# Patient Record
Sex: MALE | Race: WHITE | Employment: FULL TIME | URBAN - METROPOLITAN AREA
[De-identification: names, ages, dates, MRNs, and addresses within clinical notes are randomized per-mention and may not be internally consistent; named-entity substitution may affect disease eponyms.]

---

## 2017-06-13 ENCOUNTER — HOSPITAL ENCOUNTER (EMERGENCY)
Age: 27
Discharge: HOME | End: 2017-06-13
Attending: EMERGENCY MEDICINE
Payer: SELF-PAY

## 2017-06-13 VITALS
SYSTOLIC BLOOD PRESSURE: 135 MMHG | WEIGHT: 200 LBS | OXYGEN SATURATION: 99 % | HEIGHT: 74 IN | BODY MASS INDEX: 25.67 KG/M2 | RESPIRATION RATE: 16 BRPM | TEMPERATURE: 98.5 F | HEART RATE: 88 BPM | DIASTOLIC BLOOD PRESSURE: 78 MMHG

## 2017-06-13 DIAGNOSIS — L02.91 ABSCESS: Primary | ICD-10-CM

## 2017-06-13 PROCEDURE — 75810000218 HC FINE NEEDLE ASPIRATION: Performed by: EMERGENCY MEDICINE

## 2017-06-13 PROCEDURE — 90715 TDAP VACCINE 7 YRS/> IM: CPT | Performed by: EMERGENCY MEDICINE

## 2017-06-13 PROCEDURE — 99283 EMERGENCY DEPT VISIT LOW MDM: CPT | Performed by: EMERGENCY MEDICINE

## 2017-06-13 PROCEDURE — 90471 IMMUNIZATION ADMIN: CPT | Performed by: EMERGENCY MEDICINE

## 2017-06-13 PROCEDURE — 74011250636 HC RX REV CODE- 250/636: Performed by: EMERGENCY MEDICINE

## 2017-06-13 RX ORDER — CEPHALEXIN 500 MG/1
500 CAPSULE ORAL 4 TIMES DAILY
Qty: 28 CAP | Refills: 0 | Status: SHIPPED | OUTPATIENT
Start: 2017-06-13 | End: 2017-06-20

## 2017-06-13 RX ADMIN — TETANUS TOXOID, REDUCED DIPHTHERIA TOXOID AND ACELLULAR PERTUSSIS VACCINE, ADSORBED 0.5 ML: 5; 2.5; 8; 8; 2.5 SUSPENSION INTRAMUSCULAR at 09:29

## 2017-06-13 NOTE — ED TRIAGE NOTES
Pt states he has been working in an attic and noticed a bump on his left groin area that is painful.

## 2017-06-13 NOTE — ED PROVIDER NOTES
HPI Comments: Patient's age 71-year-old male who presents with a bump on his left inner thigh. Patient states he's been working in an attic and thinks a spider bitten him. He states redness and swelling and pain pointing to his left inner thigh region. Denies any fevers, no chills, nausea or vomiting, no further complaints. Patient very well appearing, in no acute distress. States history of a prior abscess in the past which resolved with keflex (states allergic to bactrim). Patient is a 23 y.o. male presenting with abscess. The history is provided by the patient. No  was used. Abscess           History reviewed. No pertinent past medical history. History reviewed. No pertinent surgical history. History reviewed. No pertinent family history. Social History     Social History    Marital status: SINGLE     Spouse name: N/A    Number of children: N/A    Years of education: N/A     Occupational History    Not on file. Social History Main Topics    Smoking status: Current Every Day Smoker    Smokeless tobacco: Not on file    Alcohol use No    Drug use: No    Sexual activity: Not on file     Other Topics Concern    Not on file     Social History Narrative    No narrative on file         ALLERGIES: Bactrim [sulfamethoprim ds] and Sulfa (sulfonamide antibiotics)    Review of Systems   Constitutional: Negative for chills and fever. HENT: Negative for rhinorrhea and sore throat. Eyes: Negative for visual disturbance. Respiratory: Negative for cough and shortness of breath. Cardiovascular: Negative for chest pain and leg swelling. Gastrointestinal: Negative for abdominal pain, diarrhea, nausea and vomiting. Genitourinary: Negative for dysuria. Musculoskeletal: Negative for back pain and neck pain. Skin: Positive for rash (per HPI). Neurological: Negative for weakness and headaches. Psychiatric/Behavioral: The patient is not nervous/anxious. Vitals:    06/13/17 0802   BP: 138/66   Pulse: 90   Resp: 18   Temp: 98.3 °F (36.8 °C)   SpO2: 98%   Weight: 90.7 kg (200 lb)   Height: 6' 2\" (1.88 m)            Physical Exam   Constitutional: He is oriented to person, place, and time. He appears well-developed and well-nourished. HENT:   Head: Normocephalic. Right Ear: External ear normal.   Left Ear: External ear normal.   Eyes: Conjunctivae and EOM are normal. Pupils are equal, round, and reactive to light. Neck: Normal range of motion. Neck supple. No tracheal deviation present. Cardiovascular: Normal rate, regular rhythm, normal heart sounds and intact distal pulses. No murmur heard. Pulmonary/Chest: Effort normal and breath sounds normal. No respiratory distress. Abdominal: Soft. There is no tenderness. Musculoskeletal: Normal range of motion. DP 2+ bilaterally, full ROM of extremities without difficulty. Ambulatory with no difficulty. Neurological: He is alert and oriented to person, place, and time. No cranial nerve deficit. Skin: Rash (and mild swelling to left inner thigh. Area is approximately 1 cm in diameter with white head noted. distinctly separate from penis/testicles or rectum by 5-6 inches) noted. Bedside US with minimal fluid collection noted superficially. Nursing note and vitals reviewed.        MDM  Number of Diagnoses or Management Options  Abscess: new and requires workup     Amount and/or Complexity of Data Reviewed  Review and summarize past medical records: yes    Risk of Complications, Morbidity, and/or Mortality  Presenting problems: moderate  Diagnostic procedures: moderate  Management options: moderate    Patient Progress  Patient progress: stable    ED Course       I&D Abcess Simple  Date/Time: 6/13/2017 8:34 AM  Performed by: Mary Valles  Authorized by: Mary Valles     Consent:     Consent obtained:  Verbal    Consent given by:  Patient    Risks discussed:  Bleeding, incomplete drainage, pain and infection    Alternatives discussed:  No treatment  Location:     Type:  Abscess    Location:  Lower extremity    Lower extremity location:  Leg    Leg location:  L upper leg  Pre-procedure details:     Procedure prep: alcohol swab. Anesthesia (see MAR for exact dosages): Anesthesia method:  None  Procedure type:     Complexity:  Simple  Procedure details:     Needle aspiration: yes      Needle gauge: 21 G. Drainage:  Purulent and serosanguinous    Drainage amount:  Scant    Wound treatment:  Wound left open    Packing materials:  None  Post-procedure details:     Patient tolerance of procedure: Tolerated well, no immediate complications        [id number] male with abscess:    Needle aspiration as above with purulent return followed by mild amount of blood. Neosporin/bandaid placed, given rx for keflex and given tdap in ED (unknown last tetanus shot). Patient agrees to return with any swelling, redness, fevers or chills nausea or vomiting, any drainage from the wound, or any further concerns and otherwise to follow up with PCP in 2-3 days for recheck.

## 2017-06-13 NOTE — DISCHARGE INSTRUCTIONS
Skin Abscess: Care Instructions  Your Care Instructions    A skin abscess is a bacterial infection that forms a pocket of pus. A boil is a kind of skin abscess. The doctor may have cut an opening in the abscess so that the pus can drain out. You may have gauze in the cut so that the abscess will stay open and keep draining. You may need antibiotics. You will need to follow up with your doctor to make sure the infection has gone away. The doctor has checked you carefully, but problems can develop later. If you notice any problems or new symptoms, get medical treatment right away. Follow-up care is a key part of your treatment and safety. Be sure to make and go to all appointments, and call your doctor if you are having problems. It's also a good idea to know your test results and keep a list of the medicines you take. How can you care for yourself at home? · Apply warm and dry compresses, a heating pad set on low, or a hot water bottle 3 or 4 times a day for pain. Keep a cloth between the heat source and your skin. · If your doctor prescribed antibiotics, take them as directed. Do not stop taking them just because you feel better. You need to take the full course of antibiotics. · Take pain medicines exactly as directed. ¨ If the doctor gave you a prescription medicine for pain, take it as prescribed. ¨ If you are not taking a prescription pain medicine, ask your doctor if you can take an over-the-counter medicine. · Keep your bandage clean and dry. Change the bandage whenever it gets wet or dirty, or at least one time a day. · If the abscess was packed with gauze:  ¨ Keep follow-up appointments to have the gauze changed or removed. If the doctor instructed you to remove the gauze, gently pull out all of the gauze when your doctor tells you to. ¨ After the gauze is removed, soak the area in warm water for 15 to 20 minutes 2 times a day, until the wound closes. When should you call for help?   Call your doctor now or seek immediate medical care if:  · You have signs of worsening infection, such as:  ¨ Increased pain, swelling, warmth, or redness. ¨ Red streaks leading from the infected skin. ¨ Pus draining from the wound. ¨ A fever. Watch closely for changes in your health, and be sure to contact your doctor if:  · You do not get better as expected. Where can you learn more? Go to http://keron-randy.info/. Enter X646 in the search box to learn more about \"Skin Abscess: Care Instructions. \"  Current as of: October 13, 2016  Content Version: 11.2  © 4312-0953 [employer]. Care instructions adapted under license by [employer] (which disclaims liability or warranty for this information). If you have questions about a medical condition or this instruction, always ask your healthcare professional. [address] any warranty or liability for your use of this information. Cephalexin (By mouth)   Cephalexin (vul-y-KDP-in)  Treats infections. This medicine is a cephalosporin antibiotic. Brand Name(s): Dashelley Keflex   There may be other brand names for this medicine. When This Medicine Should Not Be Used: This medicine is not right for everyone. Do not use this medicine if you had an allergic reaction to cephalexin or another cephalosporin medicine. How to Use This Medicine:   Capsule, Tablet, Tablet for Suspension, Liquid  · Your doctor will tell you how much medicine to use. Do not use more than directed. · Read and follow the patient instructions that come with this medicine. Talk to your doctor or pharmacist if you have any questions. · You may take your medicine with food or milk to avoid stomach upset. · Oral liquid: Shake well just before use. Measure the oral liquid medicine with a marked measuring spoon, oral syringe, or medicine cup.   · Take all of the medicine in your prescription to clear up your infection, even if you feel better after the first few doses. · Missed dose: Take a dose as soon as you remember. If it is almost time for your next dose, wait until then and take a regular dose. Do not take extra medicine to make up for a missed dose. · Capsule or tablet: Store at room temperature away from heat, moisture, and direct light. · Oral liquid: Store in the refrigerator for 14 days. After 14 days, throw away any unused medicine. Do not freeze. Drugs and Foods to Avoid:   Ask your doctor or pharmacist before using any other medicine, including over-the-counter medicines, vitamins, and herbal products. · Some medicines and foods can affect how cephalexin works. Tell your doctor if you are also using  ¨ Metformin  ¨ Probenecid  Warnings While Using This Medicine:   · Tell your doctor if you are pregnant or breastfeeding, or if you have kidney disease, liver disease, or a history of digestive problems, such as colitis. Tell your doctor if you are allergic to penicillin. · This medicine can cause diarrhea. Call your doctor if the diarrhea becomes severe, does not stop, or is bloody. Do not take any medicine to stop diarrhea until you have talked to your doctor. Diarrhea can occur 2 months or more after you stop taking this medicine. · Tell any doctor or dentist who treats you that you are using this medicine. This medicine may affect certain medical test results. · Call your doctor if your symptoms do not improve or if they get worse. · Keep all medicine out of the reach of children. Never share your medicine with anyone.   Possible Side Effects While Using This Medicine:   Call your doctor right away if you notice any of these side effects:  · Allergic reaction: Itching or hives, swelling in your face or hands, swelling or tingling in your mouth or throat, chest tightness, trouble breathing  · Blistering, peeling, red skin rash  · Severe diarrhea, especially if bloody or ongoing  · Severe stomach pain, vomiting  If you notice these less serious side effects, talk with your doctor:   · Mild diarrhea or nausea  If you notice other side effects that you think are caused by this medicine, tell your doctor. Call your doctor for medical advice about side effects. You may report side effects to FDA at 2-093-FDA-0504  © 2017 [employer] [employer]  Information is for End User's use only and may not be sold, redistributed or otherwise used for commercial purposes. The above information is an  only. It is not intended as medical advice for individual conditions or treatments. Talk to your doctor, nurse or pharmacist before following any medical regimen to see if it is safe and effective for you.

## 2017-08-08 ENCOUNTER — HOSPITAL ENCOUNTER (EMERGENCY)
Age: 27
Discharge: HOME | End: 2017-08-08
Attending: EMERGENCY MEDICINE
Payer: SELF-PAY

## 2017-08-08 VITALS
WEIGHT: 180 LBS | BODY MASS INDEX: 23.1 KG/M2 | OXYGEN SATURATION: 100 % | SYSTOLIC BLOOD PRESSURE: 132 MMHG | DIASTOLIC BLOOD PRESSURE: 69 MMHG | RESPIRATION RATE: 18 BRPM | HEART RATE: 77 BPM | HEIGHT: 74 IN | TEMPERATURE: 98 F

## 2017-08-08 DIAGNOSIS — L02.91 ABSCESS: Primary | ICD-10-CM

## 2017-08-08 PROCEDURE — 75810000289 HC I&D ABSCESS SIMP/COMP/MULT: Performed by: NURSE PRACTITIONER

## 2017-08-08 PROCEDURE — 74011250637 HC RX REV CODE- 250/637: Performed by: NURSE PRACTITIONER

## 2017-08-08 PROCEDURE — 99283 EMERGENCY DEPT VISIT LOW MDM: CPT | Performed by: NURSE PRACTITIONER

## 2017-08-08 RX ORDER — TRAMADOL HYDROCHLORIDE 50 MG/1
50 TABLET ORAL
Status: COMPLETED | OUTPATIENT
Start: 2017-08-08 | End: 2017-08-08

## 2017-08-08 RX ORDER — CEPHALEXIN 500 MG/1
500 CAPSULE ORAL 4 TIMES DAILY
Qty: 28 CAP | Refills: 0 | Status: SHIPPED | OUTPATIENT
Start: 2017-08-08 | End: 2017-08-15

## 2017-08-08 RX ADMIN — TRAMADOL HYDROCHLORIDE 50 MG: 50 TABLET, FILM COATED ORAL at 10:55

## 2017-08-08 NOTE — ED PROVIDER NOTES
HPI Comments: Patient presents with abscess above right knee for the past 3 days. He states some drainage from the area this morning while taking shower. He denies fever. Patient is a 33 y.o. male presenting with skin problem. The history is provided by the patient. Skin Problem    This is a new problem. The current episode started more than 2 days ago. The problem has been gradually worsening. The problem is associated with nothing. There has been no fever. The rash is present on the right upper leg. The pain is at a severity of 5/10. The pain is mild. The pain has been fluctuating since onset. Associated symptoms include pain. He has tried nothing for the symptoms. History reviewed. No pertinent past medical history. History reviewed. No pertinent surgical history. History reviewed. No pertinent family history. Social History     Social History    Marital status: SINGLE     Spouse name: N/A    Number of children: N/A    Years of education: N/A     Occupational History    Not on file. Social History Main Topics    Smoking status: Current Every Day Smoker    Smokeless tobacco: Not on file    Alcohol use No    Drug use: No    Sexual activity: Not on file     Other Topics Concern    Not on file     Social History Narrative         ALLERGIES: Bactrim [sulfamethoprim ds]; Erythromycin; and Sulfa (sulfonamide antibiotics)    Review of Systems   Constitutional: Negative for chills and fever. Respiratory: Negative for cough and shortness of breath. Cardiovascular: Negative for chest pain. Skin: Positive for color change and wound. Neurological: Negative for dizziness and weakness. Vitals:    08/08/17 0939   BP: 137/69   Pulse: 81   Resp: 16   Temp: 98.4 °F (36.9 °C)   SpO2: 100%   Weight: 81.6 kg (180 lb)   Height: 6' 2\" (1.88 m)            Physical Exam   Constitutional: He appears well-developed and well-nourished. No distress.    Cardiovascular: Normal rate and regular rhythm. No murmur heard. Pulmonary/Chest: Effort normal and breath sounds normal.   Musculoskeletal:        Right upper leg: He exhibits tenderness and swelling. He exhibits no deformity and no laceration. Legs:  Skin: Skin is warm, dry and intact. He is not diaphoretic. There is erythema. No pallor. Psychiatric: He has a normal mood and affect. His behavior is normal.   Nursing note and vitals reviewed. MDM  Number of Diagnoses or Management Options  Abscess: new and does not require workup  Diagnosis management comments: I&D preformed. Dressing applied. Patient given prescription for keflex. Amount and/or Complexity of Data Reviewed  Tests in the medicine section of CPT®: ordered    Patient Progress  Patient progress: stable    ED Course       I&D Abcess Simple  Date/Time: 8/8/2017 10:18 AM  Performed by: Zuleyma Espinoza  Authorized by: Zuleyma Espinoza     Consent:     Consent obtained:  Verbal    Consent given by:  Patient    Risks discussed:  Incomplete drainage    Alternatives discussed:  No treatment  Location:     Type:  Abscess    Size:  2cm x 2cm    Location:  Lower extremity    Lower extremity location:  Leg    Leg location:  R upper leg  Pre-procedure details:     Skin preparation:  Betadine  Anesthesia (see MAR for exact dosages): Anesthesia method:  Local infiltration    Local anesthetic:  Lidocaine 1% w/o epi  Procedure type:     Complexity:  Simple  Procedure details:     Needle aspiration: no      Incision types:  Single straight    Scalpel blade:  11    Wound management:  Probed and deloculated    Drainage:  Bloody    Drainage amount: Moderate    Wound treatment:  Wound left open    Packing materials:  None  Post-procedure details:     Patient tolerance of procedure:   Tolerated well, no immediate complications

## 2017-08-08 NOTE — LETTER
[facility] EMERGENCY DEPT [address] [address] 
[phone] Work/School Note Date: 8/8/2017 To Whom It May concern: 
 
Fe Haywood was seen and treated today in the emergency room by the following provider(s): 
Attending Provider: Rola Orlando MD 
Nurse Practitioner: ROMA Aaron. Fe Haywood was given 1 pill of tramadol for pain while in the Emergency Department. He was not given a prescription. He is to use over the counter Tylenol/Motrin for pain.   
 
Sincerely, 
 
 
 
 
ROMA Aaron

## 2017-08-08 NOTE — DISCHARGE INSTRUCTIONS

## 2024-06-05 ENCOUNTER — HOSPITAL ENCOUNTER (INPATIENT)
Facility: HOSPITAL | Age: 34
LOS: 1 days | Discharge: LEFT BEFORE DISCHARGE | DRG: 894 | End: 2024-06-07
Attending: PSYCHIATRY & NEUROLOGY | Admitting: PSYCHIATRY & NEUROLOGY

## 2024-06-05 ENCOUNTER — HOSPITAL ENCOUNTER (EMERGENCY)
Facility: HOSPITAL | Age: 34
Discharge: PSYCHIATRIC OR SUBSTANCE USE FACILITY | End: 2024-06-05
Attending: EMERGENCY MEDICINE | Admitting: EMERGENCY MEDICINE

## 2024-06-05 VITALS
HEIGHT: 74 IN | RESPIRATION RATE: 18 BRPM | DIASTOLIC BLOOD PRESSURE: 95 MMHG | HEART RATE: 95 BPM | TEMPERATURE: 98 F | WEIGHT: 160 LBS | BODY MASS INDEX: 20.53 KG/M2 | SYSTOLIC BLOOD PRESSURE: 144 MMHG | OXYGEN SATURATION: 100 %

## 2024-06-05 DIAGNOSIS — F11.20 FENTANYL DEPENDENCE: Primary | ICD-10-CM

## 2024-06-05 DIAGNOSIS — F15.10 METHAMPHETAMINE ABUSE: ICD-10-CM

## 2024-06-05 DIAGNOSIS — T42.4X4A BENZODIAZEPINE OVERDOSE OF UNDETERMINED INTENT, INITIAL ENCOUNTER: ICD-10-CM

## 2024-06-05 PROBLEM — F15.20 METHAMPHETAMINE USE DISORDER, SEVERE, DEPENDENCE: Status: ACTIVE | Noted: 2024-06-05

## 2024-06-05 PROBLEM — F10.20 ALCOHOL USE DISORDER, SEVERE, DEPENDENCE: Status: ACTIVE | Noted: 2024-06-05

## 2024-06-05 PROBLEM — F19.10 POLYSUBSTANCE ABUSE: Status: ACTIVE | Noted: 2024-06-05

## 2024-06-05 PROBLEM — F13.10 MILD BENZODIAZEPINE USE DISORDER: Status: ACTIVE | Noted: 2024-06-05

## 2024-06-05 LAB
ALBUMIN SERPL-MCNC: 3.6 G/DL (ref 3.5–5.2)
ALBUMIN/GLOB SERPL: 1.2 G/DL
ALP SERPL-CCNC: 102 U/L (ref 39–117)
ALT SERPL W P-5'-P-CCNC: 13 U/L (ref 1–41)
AMPHET+METHAMPHET UR QL: POSITIVE
AMPHETAMINES UR QL: POSITIVE
ANION GAP SERPL CALCULATED.3IONS-SCNC: 9.9 MMOL/L (ref 5–15)
AST SERPL-CCNC: 14 U/L (ref 1–40)
BARBITURATES UR QL SCN: NEGATIVE
BASOPHILS # BLD AUTO: 0.04 10*3/MM3 (ref 0–0.2)
BASOPHILS NFR BLD AUTO: 0.5 % (ref 0–1.5)
BENZODIAZ UR QL SCN: POSITIVE
BILIRUB SERPL-MCNC: <0.2 MG/DL (ref 0–1.2)
BILIRUB UR QL STRIP: NEGATIVE
BUN SERPL-MCNC: 11 MG/DL (ref 6–20)
BUN/CREAT SERPL: 15.5 (ref 7–25)
BUPRENORPHINE SERPL-MCNC: NEGATIVE NG/ML
CALCIUM SPEC-SCNC: 8.9 MG/DL (ref 8.6–10.5)
CANNABINOIDS SERPL QL: NEGATIVE
CHLORIDE SERPL-SCNC: 103 MMOL/L (ref 98–107)
CLARITY UR: CLEAR
CO2 SERPL-SCNC: 26.1 MMOL/L (ref 22–29)
COCAINE UR QL: NEGATIVE
COLOR UR: YELLOW
CREAT SERPL-MCNC: 0.71 MG/DL (ref 0.76–1.27)
DEPRECATED RDW RBC AUTO: 43.6 FL (ref 37–54)
EGFRCR SERPLBLD CKD-EPI 2021: 125 ML/MIN/1.73
EOSINOPHIL # BLD AUTO: 0.31 10*3/MM3 (ref 0–0.4)
EOSINOPHIL NFR BLD AUTO: 3.8 % (ref 0.3–6.2)
ERYTHROCYTE [DISTWIDTH] IN BLOOD BY AUTOMATED COUNT: 12.8 % (ref 12.3–15.4)
ETHANOL BLD-MCNC: <10 MG/DL (ref 0–10)
ETHANOL UR QL: <0.01 %
FENTANYL UR-MCNC: POSITIVE NG/ML
GLOBULIN UR ELPH-MCNC: 2.9 GM/DL
GLUCOSE SERPL-MCNC: 101 MG/DL (ref 65–99)
GLUCOSE UR STRIP-MCNC: NEGATIVE MG/DL
HCT VFR BLD AUTO: 41.1 % (ref 37.5–51)
HGB BLD-MCNC: 13.2 G/DL (ref 13–17.7)
HGB UR QL STRIP.AUTO: NEGATIVE
IMM GRANULOCYTES # BLD AUTO: 0.02 10*3/MM3 (ref 0–0.05)
IMM GRANULOCYTES NFR BLD AUTO: 0.2 % (ref 0–0.5)
KETONES UR QL STRIP: ABNORMAL
LEUKOCYTE ESTERASE UR QL STRIP.AUTO: NEGATIVE
LYMPHOCYTES # BLD AUTO: 3.57 10*3/MM3 (ref 0.7–3.1)
LYMPHOCYTES NFR BLD AUTO: 43.4 % (ref 19.6–45.3)
MAGNESIUM SERPL-MCNC: 1.8 MG/DL (ref 1.6–2.6)
MCH RBC QN AUTO: 29.7 PG (ref 26.6–33)
MCHC RBC AUTO-ENTMCNC: 32.1 G/DL (ref 31.5–35.7)
MCV RBC AUTO: 92.4 FL (ref 79–97)
METHADONE UR QL SCN: NEGATIVE
MONOCYTES # BLD AUTO: 0.9 10*3/MM3 (ref 0.1–0.9)
MONOCYTES NFR BLD AUTO: 10.9 % (ref 5–12)
NEUTROPHILS NFR BLD AUTO: 3.38 10*3/MM3 (ref 1.7–7)
NEUTROPHILS NFR BLD AUTO: 41.2 % (ref 42.7–76)
NITRITE UR QL STRIP: NEGATIVE
NRBC BLD AUTO-RTO: 0 /100 WBC (ref 0–0.2)
OPIATES UR QL: POSITIVE
OXYCODONE UR QL SCN: NEGATIVE
PCP UR QL SCN: NEGATIVE
PH UR STRIP.AUTO: 6 [PH] (ref 5–8)
PLATELET # BLD AUTO: 319 10*3/MM3 (ref 140–450)
PMV BLD AUTO: 8.7 FL (ref 6–12)
POTASSIUM SERPL-SCNC: 4.2 MMOL/L (ref 3.5–5.2)
PROT SERPL-MCNC: 6.5 G/DL (ref 6–8.5)
PROT UR QL STRIP: NEGATIVE
QT INTERVAL: 412 MS
QTC INTERVAL: 478 MS
RBC # BLD AUTO: 4.45 10*6/MM3 (ref 4.14–5.8)
SODIUM SERPL-SCNC: 139 MMOL/L (ref 136–145)
SP GR UR STRIP: 1.02 (ref 1–1.03)
TRICYCLICS UR QL SCN: NEGATIVE
UROBILINOGEN UR QL STRIP: ABNORMAL
WBC NRBC COR # BLD AUTO: 8.22 10*3/MM3 (ref 3.4–10.8)

## 2024-06-05 PROCEDURE — G0378 HOSPITAL OBSERVATION PER HR: HCPCS

## 2024-06-05 PROCEDURE — 82077 ASSAY SPEC XCP UR&BREATH IA: CPT | Performed by: PHYSICIAN ASSISTANT

## 2024-06-05 PROCEDURE — 93005 ELECTROCARDIOGRAM TRACING: CPT | Performed by: PSYCHIATRY & NEUROLOGY

## 2024-06-05 PROCEDURE — 99223 1ST HOSP IP/OBS HIGH 75: CPT | Performed by: PSYCHIATRY & NEUROLOGY

## 2024-06-05 PROCEDURE — 81003 URINALYSIS AUTO W/O SCOPE: CPT | Performed by: PHYSICIAN ASSISTANT

## 2024-06-05 PROCEDURE — 99285 EMERGENCY DEPT VISIT HI MDM: CPT

## 2024-06-05 PROCEDURE — 36415 COLL VENOUS BLD VENIPUNCTURE: CPT | Performed by: PHYSICIAN ASSISTANT

## 2024-06-05 PROCEDURE — 80307 DRUG TEST PRSMV CHEM ANLYZR: CPT | Performed by: PHYSICIAN ASSISTANT

## 2024-06-05 PROCEDURE — 80053 COMPREHEN METABOLIC PANEL: CPT | Performed by: PHYSICIAN ASSISTANT

## 2024-06-05 PROCEDURE — 96360 HYDRATION IV INFUSION INIT: CPT

## 2024-06-05 PROCEDURE — 85025 COMPLETE CBC W/AUTO DIFF WBC: CPT | Performed by: PHYSICIAN ASSISTANT

## 2024-06-05 PROCEDURE — 83735 ASSAY OF MAGNESIUM: CPT | Performed by: PHYSICIAN ASSISTANT

## 2024-06-05 RX ORDER — TRAZODONE HYDROCHLORIDE 50 MG/1
50 TABLET ORAL NIGHTLY PRN
Status: DISCONTINUED | OUTPATIENT
Start: 2024-06-05 | End: 2024-06-07 | Stop reason: HOSPADM

## 2024-06-05 RX ORDER — IBUPROFEN 400 MG/1
400 TABLET ORAL EVERY 6 HOURS PRN
Status: DISCONTINUED | OUTPATIENT
Start: 2024-06-05 | End: 2024-06-07 | Stop reason: HOSPADM

## 2024-06-05 RX ORDER — BENZONATATE 100 MG/1
100 CAPSULE ORAL 3 TIMES DAILY PRN
Status: DISCONTINUED | OUTPATIENT
Start: 2024-06-05 | End: 2024-06-07 | Stop reason: HOSPADM

## 2024-06-05 RX ORDER — NICOTINE 21 MG/24HR
1 PATCH, TRANSDERMAL 24 HOURS TRANSDERMAL
Status: DISCONTINUED | OUTPATIENT
Start: 2024-06-05 | End: 2024-06-07 | Stop reason: HOSPADM

## 2024-06-05 RX ORDER — ECHINACEA PURPUREA EXTRACT 125 MG
2 TABLET ORAL AS NEEDED
Status: DISCONTINUED | OUTPATIENT
Start: 2024-06-05 | End: 2024-06-07 | Stop reason: HOSPADM

## 2024-06-05 RX ORDER — MULTIVITAMIN WITH IRON
2 TABLET ORAL DAILY
Status: DISCONTINUED | OUTPATIENT
Start: 2024-06-05 | End: 2024-06-07 | Stop reason: HOSPADM

## 2024-06-05 RX ORDER — ONDANSETRON 4 MG/1
4 TABLET, ORALLY DISINTEGRATING ORAL EVERY 6 HOURS PRN
Status: DISCONTINUED | OUTPATIENT
Start: 2024-06-05 | End: 2024-06-07 | Stop reason: HOSPADM

## 2024-06-05 RX ORDER — ALUMINA, MAGNESIA, AND SIMETHICONE 2400; 2400; 240 MG/30ML; MG/30ML; MG/30ML
15 SUSPENSION ORAL EVERY 6 HOURS PRN
Status: DISCONTINUED | OUTPATIENT
Start: 2024-06-05 | End: 2024-06-07 | Stop reason: HOSPADM

## 2024-06-05 RX ORDER — HYDROXYZINE 50 MG/1
50 TABLET, FILM COATED ORAL EVERY 6 HOURS PRN
Status: DISCONTINUED | OUTPATIENT
Start: 2024-06-05 | End: 2024-06-07 | Stop reason: HOSPADM

## 2024-06-05 RX ORDER — FAMOTIDINE 20 MG/1
20 TABLET, FILM COATED ORAL 2 TIMES DAILY PRN
Status: DISCONTINUED | OUTPATIENT
Start: 2024-06-05 | End: 2024-06-07 | Stop reason: HOSPADM

## 2024-06-05 RX ORDER — MULTIPLE VITAMINS W/ MINERALS TAB 9MG-400MCG
1 TAB ORAL DAILY
Status: DISCONTINUED | OUTPATIENT
Start: 2024-06-05 | End: 2024-06-07 | Stop reason: HOSPADM

## 2024-06-05 RX ORDER — BENZTROPINE MESYLATE 1 MG/ML
1 INJECTION INTRAMUSCULAR; INTRAVENOUS ONCE AS NEEDED
Status: DISCONTINUED | OUTPATIENT
Start: 2024-06-05 | End: 2024-06-07 | Stop reason: HOSPADM

## 2024-06-05 RX ORDER — LOPERAMIDE HYDROCHLORIDE 2 MG/1
2 CAPSULE ORAL
Status: DISCONTINUED | OUTPATIENT
Start: 2024-06-05 | End: 2024-06-07 | Stop reason: HOSPADM

## 2024-06-05 RX ORDER — BENZTROPINE MESYLATE 1 MG/1
2 TABLET ORAL ONCE AS NEEDED
Status: DISCONTINUED | OUTPATIENT
Start: 2024-06-05 | End: 2024-06-07 | Stop reason: HOSPADM

## 2024-06-05 RX ORDER — ACETAMINOPHEN 325 MG/1
650 TABLET ORAL EVERY 6 HOURS PRN
Status: DISCONTINUED | OUTPATIENT
Start: 2024-06-05 | End: 2024-06-07 | Stop reason: HOSPADM

## 2024-06-05 RX ORDER — SODIUM CHLORIDE 0.9 % (FLUSH) 0.9 %
10 SYRINGE (ML) INJECTION AS NEEDED
Status: DISCONTINUED | OUTPATIENT
Start: 2024-06-05 | End: 2024-06-05 | Stop reason: HOSPADM

## 2024-06-05 RX ADMIN — Medication 100 MG: at 13:16

## 2024-06-05 RX ADMIN — Medication 1 TABLET: at 13:16

## 2024-06-05 RX ADMIN — IBUPROFEN 400 MG: 400 TABLET, FILM COATED ORAL at 13:55

## 2024-06-05 RX ADMIN — ACETAMINOPHEN 650 MG: 325 TABLET ORAL at 21:50

## 2024-06-05 RX ADMIN — SODIUM CHLORIDE 1000 ML: 9 INJECTION, SOLUTION INTRAVENOUS at 08:58

## 2024-06-05 RX ADMIN — TRAZODONE HYDROCHLORIDE 50 MG: 50 TABLET ORAL at 21:50

## 2024-06-05 RX ADMIN — Medication 2 TABLET: at 13:16

## 2024-06-05 RX ADMIN — HYDROXYZINE HYDROCHLORIDE 50 MG: 50 TABLET, FILM COATED ORAL at 13:55

## 2024-06-05 NOTE — NURSING NOTE
Intake assessment completed. Pt was brought from [facility] by a friend for detox off fentanyl, meth, and alcohol. Pt states he uses 1 gram of fentanyl daily for past 7 years and used this morning around 5am. He states he uses 0.5 gram of meth daily for past 9-10 years and last used this morning around 5am. He states he drinks a couple of pints of whiskey daily, sometimes the amount varies and also drank this morning. He states he takes a xanax on occasion and took one yesterday. He states he also smokes THC sometimes with last use a few days ago. Denies SI HI AVH. COWS 3 CIWA 2. He rates craving 8/10, anxiety 3/10, and depression 3/10. He reports good sleep and appetite. He said he plans to go to [facility] in [place] after discharge.

## 2024-06-05 NOTE — NURSING NOTE
Called and spoke with Dr. Sotomayor, given intake assessment, labs, and clinicals. He said pt may be admitted under observation status. RBTOx2 Discussed with ER provider.

## 2024-06-05 NOTE — PLAN OF CARE
Goal Outcome Evaluation:  Plan of Care Reviewed With: patient  Patient Agreement with Plan of Care: agrees     Progress: no change  Outcome Evaluation: Pt is a new admit, care plan initiated. Pt oriented to unit and rules.

## 2024-06-05 NOTE — ED PROVIDER NOTES
"Subjective   History of Present Illness  32 y/o male that presents to Emergency department with c/c \"Alcohol and drug Abuse\" x several days. Patient states that he wound like to go to detox. Patient has been abusing alcohol, fentanyl , bezno and meth. Radhan denies SI.     History provided by:  Patient   used: No    Alcohol Intoxication  Location:  Drink daily.  Severity:  Moderate  Onset quality:  Gradual  Duration:  1 day  Timing:  Intermittent  Progression:  Worsening  Chronicity:  New  Associated symptoms: fatigue and myalgias    Associated symptoms: no chest pain, no congestion, no cough, no diarrhea, no fever, no headaches, no loss of consciousness, no rash, no rhinorrhea, no shortness of breath, no sore throat and no vomiting    Risk factors:  Bodyaches  Addiction Problem  Location:  Alcohol  Severity:  Moderate  Onset quality:  Gradual  Duration:  2 days  Timing:  Intermittent  Chronicity:  New  Associated symptoms: fatigue and myalgias    Associated symptoms: no chest pain, no congestion, no cough, no diarrhea, no fever, no headaches, no loss of consciousness, no rash, no rhinorrhea, no shortness of breath, no sore throat and no vomiting        Review of Systems   Constitutional:  Positive for fatigue. Negative for fever.   HENT:  Negative for congestion, rhinorrhea and sore throat.    Eyes: Negative.  Negative for pain.   Respiratory: Negative.  Negative for cough and shortness of breath.    Cardiovascular: Negative.  Negative for chest pain and leg swelling.   Gastrointestinal: Negative.  Negative for anal bleeding, blood in stool, diarrhea and vomiting.   Endocrine: Negative.  Negative for heat intolerance, polydipsia and polyphagia.   Musculoskeletal:  Positive for arthralgias and myalgias.   Skin: Negative.  Negative for color change and rash.   Neurological: Negative.  Negative for seizures, loss of consciousness, light-headedness, numbness and headaches.   Hematological: " Negative.    Psychiatric/Behavioral: Negative.  Negative for decreased concentration, dysphoric mood, hallucinations and self-injury. The patient is not nervous/anxious.    All other systems reviewed and are negative.      No past medical history on file.    Allergies   Allergen Reactions    Erythromycin Other (See Comments)     Blisters mouth    Sulfa Antibiotics Other (See Comments)     Blisters mouth       No past surgical history on file.    No family history on file.    Social History     Socioeconomic History    Marital status: Single           Objective   Physical Exam  Vitals and nursing note reviewed.   Constitutional:       General: He is not in acute distress.     Appearance: Normal appearance. He is normal weight. He is not ill-appearing, toxic-appearing or diaphoretic.   HENT:      Head: Normocephalic and atraumatic.      Right Ear: Tympanic membrane, ear canal and external ear normal. There is no impacted cerumen.      Left Ear: Tympanic membrane, ear canal and external ear normal.      Nose: Nose normal. No congestion or rhinorrhea.      Mouth/Throat:      Mouth: Mucous membranes are moist.      Pharynx: Oropharynx is clear. No oropharyngeal exudate or posterior oropharyngeal erythema.   Eyes:      General: No scleral icterus.        Right eye: No discharge.         Left eye: No discharge.      Extraocular Movements: Extraocular movements intact.      Conjunctiva/sclera: Conjunctivae normal.      Pupils: Pupils are equal, round, and reactive to light.   Cardiovascular:      Rate and Rhythm: Normal rate and regular rhythm.      Pulses: Normal pulses.      Heart sounds: Normal heart sounds. No murmur heard.     No friction rub. No gallop.   Pulmonary:      Effort: Pulmonary effort is normal. No respiratory distress.      Breath sounds: Normal breath sounds. No stridor. No wheezing, rhonchi or rales.   Abdominal:      General: Abdomen is flat. Bowel sounds are normal. There is no distension.       Palpations: Abdomen is soft. There is no mass.      Tenderness: There is no abdominal tenderness. There is no right CVA tenderness, guarding or rebound.      Hernia: No hernia is present.   Musculoskeletal:         General: No swelling, tenderness, deformity or signs of injury. Normal range of motion.      Cervical back: Normal range of motion and neck supple. No rigidity or tenderness.      Right lower leg: No edema.   Lymphadenopathy:      Cervical: No cervical adenopathy.   Skin:     General: Skin is warm and dry.      Capillary Refill: Capillary refill takes less than 2 seconds.      Coloration: Skin is not jaundiced or pale.      Findings: No bruising, erythema or lesion.   Neurological:      General: No focal deficit present.      Mental Status: He is alert and oriented to person, place, and time. Mental status is at baseline.      Cranial Nerves: No cranial nerve deficit.      Sensory: No sensory deficit.      Motor: No weakness.      Coordination: Coordination normal.      Gait: Gait normal.   Psychiatric:         Mood and Affect: Mood normal.         Behavior: Behavior normal.         Thought Content: Thought content normal.         Judgment: Judgment normal.         Procedures           ED Course                                             Medical Decision Making  Problems Addressed:  Benzodiazepine overdose of undetermined intent, initial encounter: complicated acute illness or injury  Fentanyl dependence: complicated acute illness or injury  Methamphetamine abuse: complicated acute illness or injury    Amount and/or Complexity of Data Reviewed  Labs: ordered.    Risk  Prescription drug management.        Final diagnoses:   Fentanyl dependence   Methamphetamine abuse   Benzodiazepine overdose of undetermined intent, initial encounter       ED Disposition  ED Disposition       ED Disposition   DC/Transfer to Behavioral Health Condition   Stable    Comment   --               No follow-up provider  specified.       Medication List      No changes were made to your prescriptions during this visit.            Angel Faith PA-C  06/05/24 1018

## 2024-06-05 NOTE — H&P
INITIAL PSYCHIATRIC HISTORY & PHYSICAL    Patient Identification:  Name:  Brandon Padilla  Age:  33 y.o.  Sex:  male  :  1990  MRN:  [id number]   Visit Number:  [id number]  Primary Care Physician:  Provider, No Known    SUBJECTIVE    CC/Focus of Exam: fentanyl, alcohol and meth use    HPI: Brandon Padilla is a 33 y.o. male who was admitted under observation status on 2024 with complaints of fentanyl, alcohol and meth use and withdrawals. The patient reports a long history of substance use. First use was at age 13 when had his first drink and at age 14 he started using marijuana, acid and shrooms. He started opioids at age 18. Over time the use increased and the patient  continued to use despite negative consequences including relationship problems, social and financial problems. The patient endorses symptoms of tolerance and withdrawals and ongoing cravings to use. Has tried to cut down and stop but has not been successful. Spends too much time and resources in pursuit of substance use. Longest period of sobriety is reported to be a year.  Pt states he uses 1 gram of fentanyl daily for past 7 years and used this morning around 5am. He states he uses 0.5 gram of meth daily for past 9-10 years and last used this morning around 5am. He states he drinks a couple of pints of whiskey daily, sometimes the amount varies and also drank this morning. He states he takes a xanax on occasion and took one yesterday. He states he also smokes THC sometimes with last use a few days ago.   Withdrawal symptoms include some body aches, and states the withdrawals have not started yet as he took some on his way to the hospital today.     PAST PSYCHIATRIC HX: None reported.     SUBSTANCE USE HX: See HPI    SOCIAL HX:   Social History     Socioeconomic History    Marital status: Single   Tobacco Use    Smoking status: Every Day     Current packs/day: 1.00     Average packs/day: 1 pack/day for 10.0 years (10.0 ttl pk-yrs)      Types: Cigarettes     Start date: 6/5/2014    Smokeless tobacco: Never   Vaping Use    Vaping status: Some Days    Substances: Nicotine    Devices: Disposable   Substance and Sexual Activity    Alcohol use: Yes     Comment: couple dilcia goldman daily    Drug use: Yes     Types: Methamphetamines, Fentanyl, Marijuana    Sexual activity: Not Currently     Partners: Female     Birth control/protection: None         Past Medical History:   Diagnosis Date    GERD (gastroesophageal reflux disease)     HTN (hypertension)     Substance abuse           Past Surgical History:   Procedure Laterality Date    WISDOM TOOTH EXTRACTION         Family History   Problem Relation Age of Onset    Drug abuse Mother     Alcohol abuse Mother     Drug abuse Brother          No medications prior to admission.         ALLERGIES:  Erythromycin and Sulfa antibiotics    Temp:  [97.1 °F (36.2 °C)-98.1 °F (36.7 °C)] 97.1 °F (36.2 °C)  Heart Rate:  [84-99] 95  Resp:  [18] 18  BP: (144-159)/() 146/102    REVIEW OF SYSTEMS:  Review of Systems   Constitutional:  Positive for chills.   HENT: Negative.     Respiratory: Negative.     Cardiovascular: Negative.    Gastrointestinal: Negative.    Endocrine: Negative.    Genitourinary: Negative.    Musculoskeletal: Negative.    Skin: Negative.    Allergic/Immunologic: Negative.    Neurological:  Positive for weakness.   Hematological: Negative.    Psychiatric/Behavioral: Negative.          OBJECTIVE    PHYSICAL EXAM:  Physical Exam  Constitutional:  Appears well-developed and well-nourished.   HENT:   Head: Normocephalic and atraumatic.   Right Ear: External ear normal.   Left Ear: External ear normal.   Mouth/Throat: Oropharynx is clear and moist.   Eyes: Pupils are equal, round, and reactive to light. Conjunctivae and EOM are normal.   Neck: Normal range of motion. Neck supple.   Cardiovascular: Normal rate, regular rhythm and normal heart sounds.    Respiratory: Effort normal and breath sounds normal.  No respiratory distress. No wheezes.   GI: Soft. Bowel sounds are normal.No distension. There is no tenderness.   Musculoskeletal: Normal range of motion. No edema or deformity.   Neurological:No cranial nerve deficit. Coordination normal.   Skin: Skin is warm and dry. No rash noted. No erythema.     MENTAL STATUS EXAM:   Hygiene:   fair  Cooperation:  Cooperative  Eye Contact:  Fair  Psychomotor Behavior:  Appropriate  Affect:  Appropriate  Hopelessness: Denies  Speech:  Normal  Thought Process: Goal directed  Thought Content:  Normal  Suicidal:  None  Homicidal:  None  Hallucinations:  None  Delusion:  None  Memory:  Intact  Orientation:  Person, Place, Time and Situation  Reliability:  fair  Insight:  Fair  Judgement:  Fair  Impulse Control:  Fair    Imaging Results (Last 24 Hours)       ** No results found for the last 24 hours. **             ECG/EMG Results (most recent)       None             Lab Results   Component Value Date    GLUCOSE 101 (H) 06/05/2024    BUN 11 06/05/2024    CREATININE 0.71 (L) 06/05/2024    BCR 15.5 06/05/2024    CO2 26.1 06/05/2024    CALCIUM 8.9 06/05/2024    ALBUMIN 3.6 06/05/2024    AST 14 06/05/2024    ALT 13 06/05/2024       Lab Results   Component Value Date    WBC 8.22 06/05/2024    HGB 13.2 06/05/2024    HCT 41.1 06/05/2024    MCV 92.4 06/05/2024     06/05/2024       Last Urine Toxicity          Latest Ref Rng & Units 6/5/2024   LAST URINE TOXICITY RESULTS   Amphetamine, Urine Qual Negative Positive    Barbiturates Screen, Urine Negative Negative    Benzodiazepine Screen, Urine Negative Positive    Buprenorphine, Screen, Urine Negative Negative    Cocaine Screen, Urine Negative Negative    Fentanyl, Urine Negative Positive    Methadone Screen , Urine Negative Negative    Methamphetamine, Ur Negative Positive        Brief Urine Lab Results  (Last result in the past 365 days)        Color   Clarity   Blood   Leuk Est   Nitrite   Protein   CREAT   Urine HCG        06/05/24  0844 Yellow   Clear   Negative   Negative   Negative   Negative                   DATA  Labs reviewed. UDS positive for amphetamine, benzodiazepine, fentanyl, methamphetamine, opiate.  EKG reviewed. Pending  QUINTEN reviewed.   Record reviewed. No previous treatment noted in this hospital for mental health or substance use problems.       Strengths: Motivated for treatment    Weaknesses:Unemployed, Substance use, and Poor coping skills    Code status:  Full  Discussed code status with patient.    ASSESSMENT & PLAN:    Hospital bed: No      Opioid use disorder, severe, dependence  -Monitor for withdrawals and treat as indicated.      Alcohol use disorder, severe, dependence  -Monitor for withdrawals and treat as indicated.      Methamphetamine use disorder, severe, dependence  -Supportive treatment      Mild benzodiazepine use disorder  -Monitor for withdrawals and treat as indicated.      Nicotine use disorder  -Discussed risks of ongoing nicotine use. Encouraged cessation. Patient states he is not ready to stop yet.     The patient has been admitted under observation status for safety and stabilization.  Patient will be monitored for withdrawals and maintained on Special Precautions Level 4 (q30 min checks).  The patient will have individual and group therapy with a master's level therapist. A master treatment plan will be developed and agreed upon by the patient and his/her treatment team.  The patient's estimated length of stay in the hospital is 3-5 days.

## 2024-06-05 NOTE — NURSING NOTE
Pt is a new admit to [facility] from out ED. Pt was dropped off by a friend, he states he is from [place], [place]. Pt states he is currently homeless and going back and forth between friends houses to sleep at night. Pt states that he is here to detox so that he can go to treatment. He states that a friend went through detox here and liked it so they referred him here. He states that he plans to go to [facility] in [place], which is a year long rehab. He states his dad mentioned getting him a bus ticket there.   Pt states that he has been using 1g fentanyl/day x7 years, last use this morning. 0.5g meth/day x9-10 years, last use this morning. 2 pints whiskey/day, last use this morning. Pt reports using xanax occasionally, last use yesterday and THC occasionally, last use a few days ago. Pt reports smoking a pack of cigarettes per day for the past 10 years.   Pt rates anxiety 5, depression 2, states sleep is bad and he has only been sleeping about 2-3 hours per night. Pt states appetite is okay, pt denies SI/HI/AVH. Pt rates cravings 8, c/o body aches, anxiety, restlessness and a stuffy nose. 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 He states that once he switched jobs, he stopped calling his sponsor and going to meetings and relapsed.   Pt states he is supposed to take Lisinopril 20mg and Protonix 40mg but that he has not taken them for about a month.

## 2024-06-06 PROCEDURE — 63710000001 ONDANSETRON ODT 4 MG TABLET DISPERSIBLE: Performed by: PSYCHIATRY & NEUROLOGY

## 2024-06-06 PROCEDURE — HZ2ZZZZ DETOXIFICATION SERVICES FOR SUBSTANCE ABUSE TREATMENT: ICD-10-PCS | Performed by: PSYCHIATRY & NEUROLOGY

## 2024-06-06 PROCEDURE — 99232 SBSQ HOSP IP/OBS MODERATE 35: CPT | Performed by: PSYCHIATRY & NEUROLOGY

## 2024-06-06 RX ORDER — LORAZEPAM 2 MG/1
2 TABLET ORAL EVERY 4 HOURS PRN
Status: DISCONTINUED | OUTPATIENT
Start: 2024-06-07 | End: 2024-06-07 | Stop reason: HOSPADM

## 2024-06-06 RX ORDER — CLONIDINE HYDROCHLORIDE 0.1 MG/1
0.1 TABLET ORAL 3 TIMES DAILY PRN
Status: DISCONTINUED | OUTPATIENT
Start: 2024-06-08 | End: 2024-06-07 | Stop reason: HOSPADM

## 2024-06-06 RX ORDER — CLONIDINE HYDROCHLORIDE 0.1 MG/1
0.1 TABLET ORAL 2 TIMES DAILY PRN
Status: DISCONTINUED | OUTPATIENT
Start: 2024-06-09 | End: 2024-06-07 | Stop reason: HOSPADM

## 2024-06-06 RX ORDER — CLONIDINE HYDROCHLORIDE 0.1 MG/1
0.1 TABLET ORAL 4 TIMES DAILY PRN
Status: DISCONTINUED | OUTPATIENT
Start: 2024-06-07 | End: 2024-06-07 | Stop reason: HOSPADM

## 2024-06-06 RX ORDER — LORAZEPAM 0.5 MG/1
0.5 TABLET ORAL EVERY 4 HOURS PRN
Status: DISCONTINUED | OUTPATIENT
Start: 2024-06-10 | End: 2024-06-07 | Stop reason: HOSPADM

## 2024-06-06 RX ORDER — DICYCLOMINE HYDROCHLORIDE 10 MG/1
10 CAPSULE ORAL 3 TIMES DAILY PRN
Status: DISCONTINUED | OUTPATIENT
Start: 2024-06-06 | End: 2024-06-07 | Stop reason: HOSPADM

## 2024-06-06 RX ORDER — LORAZEPAM 2 MG/1
2 TABLET ORAL
Status: DISPENSED | OUTPATIENT
Start: 2024-06-06 | End: 2024-06-07

## 2024-06-06 RX ORDER — LORAZEPAM 1 MG/1
1 TABLET ORAL EVERY 4 HOURS PRN
Status: DISCONTINUED | OUTPATIENT
Start: 2024-06-09 | End: 2024-06-07 | Stop reason: HOSPADM

## 2024-06-06 RX ORDER — CLONIDINE HYDROCHLORIDE 0.1 MG/1
0.1 TABLET ORAL 4 TIMES DAILY PRN
Status: DISPENSED | OUTPATIENT
Start: 2024-06-06 | End: 2024-06-07

## 2024-06-06 RX ORDER — LORAZEPAM 1 MG/1
1 TABLET ORAL
Status: DISCONTINUED | OUTPATIENT
Start: 2024-06-09 | End: 2024-06-07 | Stop reason: HOSPADM

## 2024-06-06 RX ORDER — LORAZEPAM 0.5 MG/1
0.5 TABLET ORAL
Status: DISCONTINUED | OUTPATIENT
Start: 2024-06-10 | End: 2024-06-07 | Stop reason: HOSPADM

## 2024-06-06 RX ORDER — LORAZEPAM 2 MG/1
2 TABLET ORAL
Status: DISCONTINUED | OUTPATIENT
Start: 2024-06-07 | End: 2024-06-07 | Stop reason: HOSPADM

## 2024-06-06 RX ORDER — CLONIDINE HYDROCHLORIDE 0.1 MG/1
0.1 TABLET ORAL ONCE AS NEEDED
Status: DISCONTINUED | OUTPATIENT
Start: 2024-06-10 | End: 2024-06-07 | Stop reason: HOSPADM

## 2024-06-06 RX ORDER — HYDRALAZINE HYDROCHLORIDE 25 MG/1
25 TABLET, FILM COATED ORAL DAILY PRN
Status: DISCONTINUED | OUTPATIENT
Start: 2024-06-06 | End: 2024-06-07 | Stop reason: HOSPADM

## 2024-06-06 RX ORDER — CYCLOBENZAPRINE HCL 10 MG
10 TABLET ORAL 3 TIMES DAILY PRN
Status: DISCONTINUED | OUTPATIENT
Start: 2024-06-06 | End: 2024-06-07 | Stop reason: HOSPADM

## 2024-06-06 RX ADMIN — ONDANSETRON 4 MG: 4 TABLET, ORALLY DISINTEGRATING ORAL at 14:28

## 2024-06-06 RX ADMIN — LORAZEPAM 2 MG: 2 TABLET ORAL at 19:32

## 2024-06-06 RX ADMIN — CLONIDINE HYDROCHLORIDE 0.1 MG: 0.1 TABLET ORAL at 17:44

## 2024-06-06 RX ADMIN — Medication 100 MG: at 08:36

## 2024-06-06 RX ADMIN — LORAZEPAM 2 MG: 2 TABLET ORAL at 10:38

## 2024-06-06 RX ADMIN — Medication 1 TABLET: at 08:36

## 2024-06-06 RX ADMIN — ONDANSETRON 4 MG: 4 TABLET, ORALLY DISINTEGRATING ORAL at 08:36

## 2024-06-06 RX ADMIN — Medication 2 TABLET: at 08:36

## 2024-06-06 RX ADMIN — LORAZEPAM 2 MG: 2 TABLET ORAL at 17:44

## 2024-06-06 RX ADMIN — CLONIDINE HYDROCHLORIDE 0.1 MG: 0.1 TABLET ORAL at 10:38

## 2024-06-06 RX ADMIN — LORAZEPAM 2 MG: 2 TABLET ORAL at 14:28

## 2024-06-06 RX ADMIN — ACETAMINOPHEN 650 MG: 325 TABLET ORAL at 10:38

## 2024-06-06 NOTE — PLAN OF CARE
"  Problem: Adult Behavioral Health Plan of Care  Goal: Adheres to Safety Considerations for Self and Others  Intervention: Develop and Maintain Individualized Safety Plan  Recent Flowsheet Documentation  Taken 6/6/2024 1400 by Nata Lawson RN  Safety Measures: safety rounds completed  Taken 6/6/2024 1200 by Nata Lawson RN  Safety Measures: safety rounds completed  Taken 6/6/2024 1000 by Nata Lawson RN  Safety Measures: safety rounds completed  Taken 6/6/2024 0842 by Nata Lawson RN  Safety Measures: safety rounds completed  Taken 6/6/2024 0800 by Nata Lawson RN  Safety Measures: safety rounds completed   Goal Outcome Evaluation:  Plan of Care Reviewed With: patient  Patient Agreement with Plan of Care: agrees     Progress: no change  Outcome Evaluation: Pt\"s BP and HR are still elevated at times.Pleasant and cooperative,                               "

## 2024-06-06 NOTE — PLAN OF CARE
Goal Outcome Evaluation:        Problem: Adult Behavioral Health Plan of Care  Goal: Patient-Specific Goal (Individualization)  Outcome: Ongoing, Progressing  Flowsheets  Taken 6/6/2024 1008  Patient-Specific Goals (Include Timeframe): Identify 2-3 coping skills, address relapse prevention methods, complete aftercare plans, and deny SI/HI prior to discharge.  Individualized Care Needs: Therapist to offer 1-4 therapy sessions, aftercare planning, safety planning, family education, group therapy, and brief CBT/MI interventions.  Anxieties, Fears or Concerns: none verbalized  Taken 6/6/2024 0959  Patient Personal Strengths:   resourceful   resilient   positive vocational history   motivated for recovery   motivated for treatment   independent living skills   family/social support  Patient Vulnerabilities:   lacks insight into illness   substance abuse/addiction   housing insecurity   history of unsuccessful treatment   poor impulse control   occupational insecurity   limited support system   adverse childhood experience(s)  Goal: Optimized Coping Skills in Response to Life Stressors  Outcome: Ongoing, Progressing  Flowsheets (Taken 6/6/2024 1008)  Optimized Coping Skills in Response to Life Stressors: making progress toward outcome  Intervention: Promote Effective Coping Strategies  Flowsheets (Taken 6/6/2024 1008)  Supportive Measures:   active listening utilized   counseling provided   decision-making supported   goal-setting facilitated   verbalization of feelings encouraged   self-responsibility promoted   positive reinforcement provided   self-reflection promoted   self-care encouraged  Goal: Develops/Participates in Therapeutic [place] to Support Successful Transition  Outcome: Ongoing, Progressing  Flowsheets (Taken 6/6/2024 1008)  Develops/Participates in Therapeutic [place] to Support Successful Transition: making progress toward outcome  Intervention: Foster Therapeutic [place]  Flowsheets (Taken 6/6/2024  1008)  Trust Relationship/Rapport:   care explained   reassurance provided   choices provided   thoughts/feelings acknowledged   emotional support provided   empathic listening provided   questions answered   questions encouraged  Intervention: Mutually Develop Transition Plan  Flowsheets  Taken 6/6/2024 1008  Outpatient/Agency/Support Group Needs: residential services  Transition Support:   follow-up care discussed   community resources reviewed   crisis management plan promoted   crisis management plan verbalized   follow-up care coordinated  Anticipated Discharge Disposition: residential substance use unit  Taken 6/6/2024 1006  Discharge Coordination/Progress: Patient does not have insurance coverage. Therapist met with patient to complete assessment.  Transportation Anticipated: family or friend will provide  Transportation Concerns: no car  Current Discharge Risk:   substance use/abuse   homeless  Concerns to be Addressed:   substance/tobacco abuse/use   homelessness   coping/stress   discharge planning   mental health   cognitive/perceptual  Readmission Within the Last 30 Days: no previous admission in last 30 days  Patient/Family Anticipated Services at Transition: rehabilitation services  Patient's Choice of Community Agency(s): [facility] (may need to explore other options if patient does not have transportation)  Patient/Family Anticipates Transition to: inpatient rehabilitation facility  Offered/Gave Vendor List: no      DATA:      Therapist met individually with patient this date to introduce role and to discuss hospitalization expectations. Patient agreeable.     Patient signed consent for [facility] residential re[facility].     Clinical Maneuvering/Intervention:     Therapist assisted patient in processing session content; acknowledged and normalized patient’s thoughts, feelings, and concerns. Discussed the therapist/patient relationship and explain the parameters and limitations of relative confidentiality.  Also discussed the importance of active participation, and honesty to the treatment process. Encouraged the patient to discuss/vent their feelings, frustrations, and fears concerning their ongoing medical issues and validated their feelings.     Discussed the importance of finding enjoyable activities and coping skills that the patient can engage in a regular basis. Discussed healthy coping skills such as distraction, self love, grounding, thought challenges/reframing, etc. Provided patient with list of healthy coping skills this date. Discussed the importance of medication compliance. Praised the patient for seeking help and spent the majority of the session building rapport.       Allowed patient to freely discuss issues without interruption or judgment. Provided safe, confidential environment to facilitate the development of positive therapeutic relationship and encourage open, honest communication.      Therapist addressed discharge safety planning this date. Assisted patient in identifying risk factors which would indicate the need for higher level of care after discharge; including thoughts to harm self or others and/or self-harming behavior. Encouraged patient to call 911, or present to the nearest emergency room should any of these events occur. Discussed crisis intervention services and means to access. Encouraged securing any objects of harm.       Therapist completed integrated summary, treatment plan, and initiated social history this date. Therapist is strongly encouraging family involvement in treatment.       ASSESSMENT:      The patient is a 32 y/o  male admitted for detox treatment and polysubstance abuse. Therapist met with patient on this date to complete assessment. Patient reports high anxiety and moderate depression, denies SI/HI/AVH. Reports experiencing hot/cold flashes, runny nose, yawning, skin crawling sensation, nausea, sweats, chills, and fatigue. He has been using fentanyl,  methamphetamine, alcohol and occasionally Xanax and THC. Patient first started using at 12 y/o, longest period of sobriety has been one year. Patient has been living with friends in [place], [place]. Friend brought him to the ED this admission. Initially, patient had planned on going to [facility], a long term rehab in [place]. Patient now agreeable to residential rehab placement in [place], no preference on facility or location. He reports meeting with [facility] today and that he is being signed up for [place] Medicaid. Patient would prefer to complete phone screenings tomorrow due to not feeling well today. He declines family involvement in treatment. Patient denies having any additional needs or concerns at this time.      PLAN:       Patient to remain hospitalized this date.     Treatment team will focus efforts on stabilizing patient's acute symptoms while providing education on healthy coping and crisis management to reduce hospitalizations. Patient requires daily psychiatrist evaluation and 24/7 nursing supervision to promote patient safety.     Therapist will offer 1-4 individual sessions, 1 therapy group daily, family education, and appropriate referral.    Therapist recommends [facility].

## 2024-06-06 NOTE — PLAN OF CARE
Goal Outcome Evaluation:  Plan of Care Reviewed With: patient  Patient Agreement with Plan of Care: agrees     Progress: improving     PT had some difficulty falling and staying asleep. Given Trazodone and Tylenol prn medication.

## 2024-06-06 NOTE — NURSING NOTE
Dr. Gibson aware of pt's . Pt to receive Ativan 2mg and Clonidine 0.1mg. No new orders at this time.

## 2024-06-06 NOTE — PROGRESS NOTES
"INPATIENT PSYCHIATRIC PROGRESS NOTE    Name:  Brandon Padilla  :  1990  MRN:  [id number]  Visit Number:  [id number]  Length of stay:  1    SUBJECTIVE    CC/Focus of Exam: alcohol and opioid use    INTERVAL HISTORY:  The patient reports he is feeling worse today and is experiencing alcohol withdrawals.   Depression rating 5/10  Anxiety rating 8/10  Sleep:Not good  Withdrawal sx: skin crawling, yawning, runny nose, restlessness, feeling hot and cold, nausea, sweats  Cravin/10    Review of Systems   Constitutional:  Positive for chills, diaphoresis and fatigue.   Respiratory: Negative.     Cardiovascular: Negative.    Gastrointestinal:  Positive for nausea.   Neurological:  Positive for tremors and weakness.   Psychiatric/Behavioral:  Positive for dysphoric mood. The patient is nervous/anxious.        OBJECTIVE    Temp:  [96.1 °F (35.6 °C)-98.5 °F (36.9 °C)] 98.3 °F (36.8 °C)  Heart Rate:  [] 93  Resp:  [16-18] 18  BP: (104-157)/() 156/105    MENTAL STATUS EXAM:  Appearance:Casually dressed, good hygeine.   Cooperation:Cooperative  Psychomotor: No psychomotor agitation/retardation, No EPS, No motor tics  Speech-normal rate, amount.  Mood \"anxious\"   Affect-congruent, appropriate, stable  Thought Content-goal directed, no delusional material present  Thought process-linear, organized.  Suicidality: No SI  Homicidality: No HI  Perception: No AH/VH  Insight-fair   Judgement-fair    Lab Results (last 24 hours)       ** No results found for the last 24 hours. **               Imaging Results (Last 24 Hours)       ** No results found for the last 24 hours. **               ECG/EMG Results (most recent)       Procedure Component Value Units Date/Time    ECG 12 Lead Other; Baseline Cardiac Status [[id number]] Collected: 24 1531     Updated: 24 1611     QT Interval 412 ms      QTC Interval 478 ms     Narrative:      Test Reason : Other~  Blood Pressure :   */*   mmHG  Vent. Rate :  81 BPM     " Atrial Rate :  81 BPM     P-R Int : 136 ms          QRS Dur : 110 ms      QT Int : 412 ms       P-R-T Axes :  65  78  58 degrees     QTc Int : 478 ms    Normal sinus rhythm  Normal ECG  No previous ECGs available  Confirmed by Silvino Zaidi ([id number]) on 6/5/2024 4:11:35 PM    Referred By:            Confirmed By: Silvino Zaidi             ALLERGIES: Erythromycin and Sulfa antibiotics    Medication Review:   Scheduled Medications:  B-complex with vitamin C, 2 tablet, Oral, Daily  multivitamin with minerals, 1 tablet, Oral, Daily  nicotine, 1 patch, Transdermal, Q24H  thiamine, 100 mg, Oral, Daily         PRN Medications:    acetaminophen    aluminum-magnesium hydroxide-simethicone    benzonatate    benztropine **OR** benztropine    famotidine    hydrOXYzine    ibuprofen    loperamide    magnesium hydroxide    ondansetron ODT    sodium chloride    traZODone   All medications reviewed.    ASSESSMENT & PLAN:      Opioid use disorder, severe, dependence  -Start clonidine detox  -Comfort meds       Alcohol use disorder, severe, dependence  -Start Ativan detox.       Methamphetamine use disorder, severe, dependence  -Supportive treatment       Mild benzodiazepine use disorder  -Ativan detox.       Nicotine use disorder  -Discussed risks of ongoing nicotine use. Encouraged cessation. Patient states he is not ready to stop yet.     Special precautions: Special Precautions Level 4 (q30 min checks).    Behavioral Health Treatment Plan and Problem List: I have reviewed and approved the Behavioral Health Treatment Plan and Problem list.  The patient has had a chance to review and agrees with the treatment plan.    Copied text in portions of this note has been reviewed and is accurate as of 06/06/24         Clinician:  Benjamin Beaz MD  06/06/24  10:13 EDT

## 2024-06-07 VITALS
SYSTOLIC BLOOD PRESSURE: 149 MMHG | WEIGHT: 180.4 LBS | OXYGEN SATURATION: 98 % | BODY MASS INDEX: 23.15 KG/M2 | RESPIRATION RATE: 16 BRPM | HEART RATE: 128 BPM | DIASTOLIC BLOOD PRESSURE: 66 MMHG | HEIGHT: 74 IN | TEMPERATURE: 97.1 F

## 2024-06-07 PROCEDURE — 99239 HOSP IP/OBS DSCHRG MGMT >30: CPT | Performed by: PSYCHIATRY & NEUROLOGY

## 2024-06-07 RX ORDER — DIAZEPAM 5 MG/1
10 TABLET ORAL ONCE
Status: COMPLETED | OUTPATIENT
Start: 2024-06-07 | End: 2024-06-07

## 2024-06-07 RX ORDER — BUPRENORPHINE 2 MG/1
2 TABLET SUBLINGUAL 3 TIMES DAILY
Status: DISCONTINUED | OUTPATIENT
Start: 2024-06-07 | End: 2024-06-07 | Stop reason: HOSPADM

## 2024-06-07 RX ORDER — DIAZEPAM 5 MG/ML
10 INJECTION, SOLUTION INTRAMUSCULAR; INTRAVENOUS ONCE
Status: DISCONTINUED | OUTPATIENT
Start: 2024-06-07 | End: 2024-06-07

## 2024-06-07 RX ORDER — PROPRANOLOL HYDROCHLORIDE 10 MG/1
10 TABLET ORAL ONCE
Status: COMPLETED | OUTPATIENT
Start: 2024-06-07 | End: 2024-06-07

## 2024-06-07 RX ORDER — BUPRENORPHINE 2 MG/1
2 TABLET SUBLINGUAL 2 TIMES DAILY
Status: DISCONTINUED | OUTPATIENT
Start: 2024-06-08 | End: 2024-06-07 | Stop reason: HOSPADM

## 2024-06-07 RX ORDER — BUPRENORPHINE 2 MG/1
2 TABLET SUBLINGUAL DAILY
Status: DISCONTINUED | OUTPATIENT
Start: 2024-06-09 | End: 2024-06-07 | Stop reason: HOSPADM

## 2024-06-07 RX ADMIN — Medication 1 TABLET: at 09:00

## 2024-06-07 RX ADMIN — DIAZEPAM 10 MG: 5 TABLET ORAL at 04:26

## 2024-06-07 RX ADMIN — Medication 100 MG: at 09:00

## 2024-06-07 RX ADMIN — Medication 2 TABLET: at 09:00

## 2024-06-07 RX ADMIN — CLONIDINE HYDROCHLORIDE 0.1 MG: 0.1 TABLET ORAL at 00:58

## 2024-06-07 RX ADMIN — HYDROXYZINE HYDROCHLORIDE 50 MG: 50 TABLET, FILM COATED ORAL at 09:00

## 2024-06-07 RX ADMIN — LOPERAMIDE HYDROCHLORIDE 2 MG: 2 CAPSULE ORAL at 09:00

## 2024-06-07 RX ADMIN — CYCLOBENZAPRINE 10 MG: 10 TABLET, FILM COATED ORAL at 00:58

## 2024-06-07 RX ADMIN — LORAZEPAM 2 MG: 2 TABLET ORAL at 09:00

## 2024-06-07 RX ADMIN — PROPRANOLOL HYDROCHLORIDE 10 MG: 10 TABLET ORAL at 04:26

## 2024-06-07 RX ADMIN — LORAZEPAM 2 MG: 2 TABLET ORAL at 12:28

## 2024-06-07 RX ADMIN — BUPRENORPHINE HCL 2 MG: 2 TABLET SUBLINGUAL at 11:14

## 2024-06-07 RX ADMIN — LORAZEPAM 2 MG: 2 TABLET ORAL at 03:00

## 2024-06-07 RX ADMIN — ACETAMINOPHEN 650 MG: 325 TABLET ORAL at 05:43

## 2024-06-07 RX ADMIN — IBUPROFEN 400 MG: 400 TABLET, FILM COATED ORAL at 09:00

## 2024-06-07 RX ADMIN — LORAZEPAM 2 MG: 2 TABLET ORAL at 01:00

## 2024-06-07 RX ADMIN — TRAZODONE HYDROCHLORIDE 50 MG: 50 TABLET ORAL at 02:28

## 2024-06-07 RX ADMIN — CLONIDINE HYDROCHLORIDE 0.1 MG: 0.1 TABLET ORAL at 09:01

## 2024-06-07 NOTE — NURSING NOTE
Patient reports wanting to leave against medical advice notified Dr. Baez. Dr. Baez discharged patient against medical advice. Patient reports that he wants to leave because of having withdrawal symptoms and states that he is concerned that symptoms will be worse. Explained the benefits and risks of leaving against medical advice and the risk of accidental overdose and death. Patient verbalized understanding of discharge instructions. Patient requested to leave unit to wait on transportation. Notified Roberta Lo RN.

## 2024-06-07 NOTE — NURSING NOTE
Dr. Gibson contacted related to patient having heart rate of 137 on monitor and 130 manual. COWS 16 CIWA 13    New orders given :  Valium 10mg po once  Propanolol 10mg once

## 2024-06-07 NOTE — PROGRESS NOTES
"INPATIENT PSYCHIATRIC PROGRESS NOTE    Name:  Brandon Padilla  :  1990  MRN:  [id number]  Visit Number:  [id number]  Length of stay:  2    SUBJECTIVE    CC/Focus of Exam: alcohol and opioid use    INTERVAL HISTORY:  The patient reports he is not feeling too good today. States he is experiencing withdrawals from fentanyl now and it is getting worse.   Depression rating 8/10  Anxiety rating 9/10  Sleep:Not good  Withdrawal sx: hot and cold, chills, sweats, anxiety, upset stomach, yawning.  Cravin/10    Review of Systems   Constitutional:  Positive for chills, diaphoresis and fatigue.   Respiratory: Negative.     Cardiovascular: Negative.    Gastrointestinal:  Positive for nausea.   Neurological:  Positive for tremors and weakness.   Psychiatric/Behavioral:  Positive for dysphoric mood. The patient is nervous/anxious.        OBJECTIVE    Temp:  [97.2 °F (36.2 °C)-98.4 °F (36.9 °C)] 97.6 °F (36.4 °C)  Heart Rate:  [] 91  Resp:  [16-18] 18  BP: (123-163)/() 143/93    MENTAL STATUS EXAM:  Appearance:Casually dressed, good hygeine.   Cooperation:Cooperative  Psychomotor: No psychomotor agitation/retardation, No EPS, No motor tics  Speech-normal rate, amount.  Mood \"anxious\"   Affect-congruent, appropriate, stable  Thought Content-goal directed, no delusional material present  Thought process-linear, organized.  Suicidality: No SI  Homicidality: No HI  Perception: No AH/VH  Insight-fair   Judgement-fair    Lab Results (last 24 hours)       ** No results found for the last 24 hours. **               Imaging Results (Last 24 Hours)       ** No results found for the last 24 hours. **               ECG/EMG Results (most recent)       Procedure Component Value Units Date/Time    ECG 12 Lead Other; Baseline Cardiac Status [[id number]] Collected: 24 1531     Updated: 24 1611     QT Interval 412 ms      QTC Interval 478 ms     Narrative:      Test Reason : Other~  Blood Pressure :   */*   " mmHG  Vent. Rate :  81 BPM     Atrial Rate :  81 BPM     P-R Int : 136 ms          QRS Dur : 110 ms      QT Int : 412 ms       P-R-T Axes :  65  78  58 degrees     QTc Int : 478 ms    Normal sinus rhythm  Normal ECG  No previous ECGs available  Confirmed by Silvino Zaidi ([id number]) on 6/5/2024 4:11:35 PM    Referred By:            Confirmed By: Silvino Zaidi             ALLERGIES: Erythromycin and Sulfa antibiotics    Medication Review:   Scheduled Medications:  B-complex with vitamin C, 2 tablet, Oral, Daily  LORazepam, 2 mg, Oral, 3 times per day   Followed by  [START ON 6/8/2024] LORazepam, 1.5 mg, Oral, 3 times per day   Followed by  [START ON 6/9/2024] LORazepam, 1 mg, Oral, 3 times per day   Followed by  [START ON 6/10/2024] LORazepam, 0.5 mg, Oral, 3 times per day  multivitamin with minerals, 1 tablet, Oral, Daily  nicotine, 1 patch, Transdermal, Q24H  thiamine, 100 mg, Oral, Daily         PRN Medications:    acetaminophen    aluminum-magnesium hydroxide-simethicone    benzonatate    benztropine **OR** benztropine    cloNIDine **FOLLOWED BY** [START ON 6/8/2024] cloNIDine **FOLLOWED BY** [START ON 6/9/2024] cloNIDine **FOLLOWED BY** [START ON 6/10/2024] cloNIDine    cyclobenzaprine    dicyclomine    famotidine    hydrALAZINE    hydrOXYzine    ibuprofen    loperamide    LORazepam **FOLLOWED BY** [START ON 6/8/2024] LORazepam **FOLLOWED BY** [START ON 6/9/2024] LORazepam **FOLLOWED BY** [START ON 6/10/2024] LORazepam    magnesium hydroxide    ondansetron ODT    sodium chloride    traZODone   All medications reviewed.    ASSESSMENT & PLAN:      Opioid use disorder, severe, dependence  -Continue clonidine detox  -Comfort meds  -Start Subutex detox       Alcohol use disorder, severe, dependence  -ContinueAtivan detox.       Methamphetamine use disorder, severe, dependence  -Supportive treatment       Mild benzodiazepine use disorder  -Continue Ativan detox.       Nicotine use disorder  -Discussed risks of  ongoing nicotine use. Encouraged cessation. Patient states he is not ready to stop yet.     Special precautions: Special Precautions Level 4 (q30 min checks).    Behavioral Health Treatment Plan and Problem List: I have reviewed and approved the Behavioral Health Treatment Plan and Problem list.  The patient has had a chance to review and agrees with the treatment plan.    Copied text in portions of this note has been reviewed and is accurate as of 06/07/24         Clinician:  Benjamin Baez MD  06/07/24  10:49 EDT

## 2024-06-07 NOTE — DISCHARGE PLACEMENT REQUEST
"Brandon Kaur (33 y.o. Male)       Date of Birth   1990    Social Security Number       Address   [address] [place] [address]    Home Phone   [phone]    MRN   [id number]       [facility]    Marital Status   Single                            Admission Date   24    Admission Type   Emergency    Admitting Provider   Benjamin Baez MD    Attending Provider   Benjamin Baez MD    Department, Room/Bed   [facility] ADULT CD, 1043/2S       Discharge Date       Discharge Disposition       Discharge Destination                                 Attending Provider: Benjamin Baez MD    Allergies: Erythromycin, Sulfa Antibiotics    Isolation: None   Infection: None   Code Status: CPR    Ht: 188 cm (74\")   Wt: 81.8 kg (180 lb 6.4 oz)    Admission Cmt: None   Principal Problem: Opioid use disorder, severe, dependence [F11.20]                   Active Insurance as of 2024       Patient has no active insurance coverage on file for 2024.            Emergency Contacts        (Rel.) Home Phone Work Phone Mobile Phone    BRANDON KAUR SR (Father) [phone] -- --                 History & Physical        Benjamin Baez MD at 24 1339                INITIAL PSYCHIATRIC HISTORY & PHYSICAL    Patient Identification:  Name:  Brandon Kaur  Age:  33 y.o.  Sex:  male  :  1990  MRN:  [id number]   Visit Number:  [id number]  Primary Care Physician:  Provider, No Known    SUBJECTIVE    CC/Focus of Exam: fentanyl, alcohol and meth use    HPI: Brandon Kaur is a 33 y.o. male who was admitted under observation status on 2024 with complaints of fentanyl, alcohol and meth use and withdrawals. The patient reports a long history of substance use. First use was at age 13 when had his first drink and at age 14 he started using marijuana, acid and shrooms. He started opioids at age 18. Over time the use increased and the patient  continued to use despite negative " consequences including relationship problems, social and financial problems. The patient endorses symptoms of tolerance and withdrawals and ongoing cravings to use. Has tried to cut down and stop but has not been successful. Spends too much time and resources in pursuit of substance use. Longest period of sobriety is reported to be a year.  Pt states he uses 1 gram of fentanyl daily for past 7 years and used this morning around 5am. He states he uses 0.5 gram of meth daily for past 9-10 years and last used this morning around 5am. He states he drinks a couple of pints of whiskey daily, sometimes the amount varies and also drank this morning. He states he takes a xanax on occasion and took one yesterday. He states he also smokes THC sometimes with last use a few days ago.   Withdrawal symptoms include some body aches, and states the withdrawals have not started yet as he took some on his way to the hospital today.     PAST PSYCHIATRIC HX: None reported.     SUBSTANCE USE HX: See HPI    SOCIAL HX:   Social History     Socioeconomic History    Marital status: Single   Tobacco Use    Smoking status: Every Day     Current packs/day: 1.00     Average packs/day: 1 pack/day for 10.0 years (10.0 ttl pk-yrs)     Types: Cigarettes     Start date: 6/5/2014    Smokeless tobacco: Never   Vaping Use    Vaping status: Some Days    Substances: Nicotine    Devices: Disposable   Substance and Sexual Activity    Alcohol use: Yes     Comment: couple pnts whiskey daily    Drug use: Yes     Types: Methamphetamines, Fentanyl, Marijuana    Sexual activity: Not Currently     Partners: Female     Birth control/protection: None         Past Medical History:   Diagnosis Date    GERD (gastroesophageal reflux disease)     HTN (hypertension)     Substance abuse           Past Surgical History:   Procedure Laterality Date    WISDOM TOOTH EXTRACTION         Family History   Problem Relation Age of Onset    Drug abuse Mother     Alcohol abuse Mother      Drug abuse Brother          No medications prior to admission.         ALLERGIES:  Erythromycin and Sulfa antibiotics    Temp:  [97.1 °F (36.2 °C)-98.1 °F (36.7 °C)] 97.1 °F (36.2 °C)  Heart Rate:  [84-99] 95  Resp:  [18] 18  BP: (144-159)/() 146/102    REVIEW OF SYSTEMS:  Review of Systems   Constitutional:  Positive for chills.   HENT: Negative.     Respiratory: Negative.     Cardiovascular: Negative.    Gastrointestinal: Negative.    Endocrine: Negative.    Genitourinary: Negative.    Musculoskeletal: Negative.    Skin: Negative.    Allergic/Immunologic: Negative.    Neurological:  Positive for weakness.   Hematological: Negative.    Psychiatric/Behavioral: Negative.          OBJECTIVE    PHYSICAL EXAM:  Physical Exam  Constitutional:  Appears well-developed and well-nourished.   HENT:   Head: Normocephalic and atraumatic.   Right Ear: External ear normal.   Left Ear: External ear normal.   Mouth/Throat: Oropharynx is clear and moist.   Eyes: Pupils are equal, round, and reactive to light. Conjunctivae and EOM are normal.   Neck: Normal range of motion. Neck supple.   Cardiovascular: Normal rate, regular rhythm and normal heart sounds.    Respiratory: Effort normal and breath sounds normal. No respiratory distress. No wheezes.   GI: Soft. Bowel sounds are normal.No distension. There is no tenderness.   Musculoskeletal: Normal range of motion. No edema or deformity.   Neurological:No cranial nerve deficit. Coordination normal.   Skin: Skin is warm and dry. No rash noted. No erythema.     MENTAL STATUS EXAM:   Hygiene:   fair  Cooperation:  Cooperative  Eye Contact:  Fair  Psychomotor Behavior:  Appropriate  Affect:  Appropriate  Hopelessness: Denies  Speech:  Normal  Thought Process: Goal directed  Thought Content:  Normal  Suicidal:  None  Homicidal:  None  Hallucinations:  None  Delusion:  None  Memory:  Intact  Orientation:  Person, Place, Time and Situation  Reliability:  fair  Insight:   Fair  Judgement:  Fair  Impulse Control:  Fair    Imaging Results (Last 24 Hours)       ** No results found for the last 24 hours. **             ECG/EMG Results (most recent)       None             Lab Results   Component Value Date    GLUCOSE 101 (H) 06/05/2024    BUN 11 06/05/2024    CREATININE 0.71 (L) 06/05/2024    BCR 15.5 06/05/2024    CO2 26.1 06/05/2024    CALCIUM 8.9 06/05/2024    ALBUMIN 3.6 06/05/2024    AST 14 06/05/2024    ALT 13 06/05/2024       Lab Results   Component Value Date    WBC 8.22 06/05/2024    HGB 13.2 06/05/2024    HCT 41.1 06/05/2024    MCV 92.4 06/05/2024     06/05/2024       Last Urine Toxicity          Latest Ref Rng & Units 6/5/2024   LAST URINE TOXICITY RESULTS   Amphetamine, Urine Qual Negative Positive    Barbiturates Screen, Urine Negative Negative    Benzodiazepine Screen, Urine Negative Positive    Buprenorphine, Screen, Urine Negative Negative    Cocaine Screen, Urine Negative Negative    Fentanyl, Urine Negative Positive    Methadone Screen , Urine Negative Negative    Methamphetamine, Ur Negative Positive        Brief Urine Lab Results  (Last result in the past 365 days)        Color   Clarity   Blood   Leuk Est   Nitrite   Protein   CREAT   Urine HCG        06/05/24 0844 Yellow   Clear   Negative   Negative   Negative   Negative                   DATA  Labs reviewed. UDS positive for amphetamine, benzodiazepine, fentanyl, methamphetamine, opiate.  EKG reviewed. Pending  QUINTEN reviewed.   Record reviewed. No previous treatment noted in this hospital for mental health or substance use problems.       Strengths: Motivated for treatment    Weaknesses:Unemployed, Substance use, and Poor coping skills    Code status:  Full  Discussed code status with patient.    ASSESSMENT & PLAN:    Hospital bed: No      Opioid use disorder, severe, dependence  -Monitor for withdrawals and treat as indicated.      Alcohol use disorder, severe, dependence  -Monitor for withdrawals and  treat as indicated.      Methamphetamine use disorder, severe, dependence  -Supportive treatment      Mild benzodiazepine use disorder  -Monitor for withdrawals and treat as indicated.      Nicotine use disorder  -Discussed risks of ongoing nicotine use. Encouraged cessation. Patient states he is not ready to stop yet.     The patient has been admitted under observation status for safety and stabilization.  Patient will be monitored for withdrawals and maintained on Special Precautions Level 4 (q30 min checks).  The patient will have individual and group therapy with a master's level therapist. A master treatment plan will be developed and agreed upon by the patient and his/her treatment team.  The patient's estimated length of stay in the hospital is 3-5 days.               Electronically signed by Benjamin Baez MD at 24 1350          Physician Progress Notes (last 24 hours)        Benjamin Baez MD at 24 1012          INPATIENT PSYCHIATRIC PROGRESS NOTE    Name:  Brandon Padilla  :  1990  MRN:  [id number]  Visit Number:  [id number]  Length of stay:  1    SUBJECTIVE    CC/Focus of Exam: alcohol and opioid use    INTERVAL HISTORY:  The patient reports he is feeling worse today and is experiencing alcohol withdrawals.   Depression rating 5/10  Anxiety rating 8/10  Sleep:Not good  Withdrawal sx: skin crawling, yawning, runny nose, restlessness, feeling hot and cold, nausea, sweats  Cravin/10    Review of Systems   Constitutional:  Positive for chills, diaphoresis and fatigue.   Respiratory: Negative.     Cardiovascular: Negative.    Gastrointestinal:  Positive for nausea.   Neurological:  Positive for tremors and weakness.   Psychiatric/Behavioral:  Positive for dysphoric mood. The patient is nervous/anxious.        OBJECTIVE    Temp:  [96.1 °F (35.6 °C)-98.5 °F (36.9 °C)] 98.3 °F (36.8 °C)  Heart Rate:  [] 93  Resp:  [16-18] 18  BP: (104-157)/() 156/105    MENTAL STATUS  "EXAM:  Appearance:Casually dressed, good hygeine.   Cooperation:Cooperative  Psychomotor: No psychomotor agitation/retardation, No EPS, No motor tics  Speech-normal rate, amount.  Mood \"anxious\"   Affect-congruent, appropriate, stable  Thought Content-goal directed, no delusional material present  Thought process-linear, organized.  Suicidality: No SI  Homicidality: No HI  Perception: No AH/VH  Insight-fair   Judgement-fair    Lab Results (last 24 hours)       ** No results found for the last 24 hours. **               Imaging Results (Last 24 Hours)       ** No results found for the last 24 hours. **               ECG/EMG Results (most recent)       Procedure Component Value Units Date/Time    ECG 12 Lead Other; Baseline Cardiac Status [[id number]] Collected: 06/05/24 1531     Updated: 06/05/24 1611     QT Interval 412 ms      QTC Interval 478 ms     Narrative:      Test Reason : Other~  Blood Pressure :   */*   mmHG  Vent. Rate :  81 BPM     Atrial Rate :  81 BPM     P-R Int : 136 ms          QRS Dur : 110 ms      QT Int : 412 ms       P-R-T Axes :  65  78  58 degrees     QTc Int : 478 ms    Normal sinus rhythm  Normal ECG  No previous ECGs available  Confirmed by Silvino Zaidi ([id number]) on 6/5/2024 4:11:35 PM    Referred By:            Confirmed By: Silvino Zaidi             ALLERGIES: Erythromycin and Sulfa antibiotics    Medication Review:   Scheduled Medications:  B-complex with vitamin C, 2 tablet, Oral, Daily  multivitamin with minerals, 1 tablet, Oral, Daily  nicotine, 1 patch, Transdermal, Q24H  thiamine, 100 mg, Oral, Daily         PRN Medications:    acetaminophen    aluminum-magnesium hydroxide-simethicone    benzonatate    benztropine **OR** benztropine    famotidine    hydrOXYzine    ibuprofen    loperamide    magnesium hydroxide    ondansetron ODT    sodium chloride    traZODone   All medications reviewed.    ASSESSMENT & PLAN:      Opioid use disorder, severe, dependence  -Start clonidine " detox  -Comfort meds       Alcohol use disorder, severe, dependence  -Start Ativan detox.       Methamphetamine use disorder, severe, dependence  -Supportive treatment       Mild benzodiazepine use disorder  -Ativan detox.       Nicotine use disorder  -Discussed risks of ongoing nicotine use. Encouraged cessation. Patient states he is not ready to stop yet.     Special precautions: Special Precautions Level 4 (q30 min checks).    Behavioral Health Treatment Plan and Problem List: I have reviewed and approved the Behavioral Health Treatment Plan and Problem list.  The patient has had a chance to review and agrees with the treatment plan.    Copied text in portions of this note has been reviewed and is accurate as of 06/06/24         Clinician:  Benjamin Baez MD  06/06/24  10:13 EDT    Electronically signed by Benjamin Baez MD at 06/06/24 5644

## 2024-06-07 NOTE — PLAN OF CARE
Goal Outcome Evaluation:      Dr. Baez is discharging patient home today.

## 2024-06-07 NOTE — CASE MANAGEMENT/SOCIAL WORK
Patient Name:  Brandon Padilla  YOB: 1990  MRN: [id number]  Admit Date:  6/5/2024    Patient discharged AMA.     Electronically signed by:  LUISA Trimble  06/07/24 13:29 EDT

## 2024-06-07 NOTE — PLAN OF CARE
Goal Outcome Evaluation:  Plan of Care Reviewed With: patient  Patient Agreement with Plan of Care: agrees     Progress: improving     PT had difficulty staying asleep. Given multiple PAS doses for CIWA  scores, COWS scores, and elevated vitals.

## 2024-06-07 NOTE — DISCHARGE SUMMARY
":  1990  MRN:  [id number]  Visit Number:  [id number]      Date of Admission:2024   Date of Discharge:  2024    Discharge Diagnosis:  Principal Problem:    Opioid use disorder, severe, dependence  Active Problems:    Alcohol use disorder, severe, dependence    Methamphetamine use disorder, severe, dependence    Mild benzodiazepine use disorder        Admission Diagnosis:  Polysubstance abuse [F19.10]  Alcohol use disorder, severe, dependence [F10.20]     MARY  Brandon Padilla is a 33 y.o. male who was admitted under observation status on 2024 with complaints of fentanyl, alcohol and meth use and withdrawals.   For details please see H&P dated 24.     Hospital Course  Patient is a 33 y.o. male presented with alcohol and opioid use and withdrawals. The patient was admitted to the detox recovery unit and started on Ativan detox for alcohol withdrawals and clonidine detox for opioid withdrawals.  The patient reported worsening withdrawal symptoms the next day and the Ativan detox was continued and he was also continued on clonidine detox.  The next day he reported feeling worse and reported that he was not experiencing more severe opioid withdrawals.  Last use was more than 2 days ago.  He agreed to start Subutex detox.  After he took the first dose of Subutex he reported worsening of the opioid withdrawals and appeared to be in precipitate withdrawals.  At that time he decided that he did not want to stay in treatment.  He was encouraged to stay and complete the treatment and supportive medications were offered.  Patient was adamant on leaving.  Risks of untreated alcohol withdrawals were discussed with the patient.  The risk of accidental overdose was also discussed with the patient.  Patient insisted on being discharged and he was then discharged AMA.    Mental Status Exam upon discharge:   Mood \"anxious\"   Affect-congruent, appropriate, stable  Thought Content-goal directed, no delusional " material present  Thought process-linear, organized.  Suicidality: No SI  Homicidality: No HI  Perception: No AH/VH    Procedures Performed         Consults:   Consults       No orders found from 5/7/2024 to 6/6/2024.            Pertinent Test Results:   Admission on 06/05/2024   Component Date Value Ref Range Status    QT Interval 06/05/2024 412  ms Final    QTC Interval 06/05/2024 478  ms Final   Admission on 06/05/2024, Discharged on 06/05/2024   Component Date Value Ref Range Status    Ethanol 06/05/2024 <10  0 - 10 mg/dL Final    Ethanol % 06/05/2024 <0.010  % Final    Glucose 06/05/2024 101 (H)  65 - 99 mg/dL Final    BUN 06/05/2024 11  6 - 20 mg/dL Final    Creatinine 06/05/2024 0.71 (L)  0.76 - 1.27 mg/dL Final    Sodium 06/05/2024 139  136 - 145 mmol/L Final    Potassium 06/05/2024 4.2  3.5 - 5.2 mmol/L Final    Chloride 06/05/2024 103  98 - 107 mmol/L Final    CO2 06/05/2024 26.1  22.0 - 29.0 mmol/L Final    Calcium 06/05/2024 8.9  8.6 - 10.5 mg/dL Final    Total Protein 06/05/2024 6.5  6.0 - 8.5 g/dL Final    Albumin 06/05/2024 3.6  3.5 - 5.2 g/dL Final    ALT (SGPT) 06/05/2024 13  1 - 41 U/L Final    AST (SGOT) 06/05/2024 14  1 - 40 U/L Final    Alkaline Phosphatase 06/05/2024 102  39 - 117 U/L Final    Total Bilirubin 06/05/2024 <0.2  0.0 - 1.2 mg/dL Final    Globulin 06/05/2024 2.9  gm/dL Final    A/G Ratio 06/05/2024 1.2  g/dL Final    BUN/Creatinine Ratio 06/05/2024 15.5  7.0 - 25.0 Final    Anion Gap 06/05/2024 9.9  5.0 - 15.0 mmol/L Final    eGFR 06/05/2024 125.0  >60.0 mL/min/1.73 Final    Color, UA 06/05/2024 Yellow  Yellow, Straw Final    Appearance, UA 06/05/2024 Clear  Clear Final    pH, UA 06/05/2024 6.0  5.0 - 8.0 Final    Specific Gravity, UA 06/05/2024 1.025  1.005 - 1.030 Final    Glucose, UA 06/05/2024 Negative  Negative Final    Ketones, UA 06/05/2024 Trace (A)  Negative Final    Bilirubin, UA 06/05/2024 Negative  Negative Final    Blood, UA 06/05/2024 Negative  Negative Final     Protein, UA 06/05/2024 Negative  Negative Final    Leuk Esterase, UA 06/05/2024 Negative  Negative Final    Nitrite, UA 06/05/2024 Negative  Negative Final    Urobilinogen, UA 06/05/2024 1.0 E.U./dL  0.2 - 1.0 E.U./dL Final    THC, Screen, Urine 06/05/2024 Negative  Negative Final    Phencyclidine (PCP), Urine 06/05/2024 Negative  Negative Final    Cocaine Screen, Urine 06/05/2024 Negative  Negative Final    Methamphetamine, Ur 06/05/2024 Positive (A)  Negative Final    Opiate Screen 06/05/2024 Positive (A)  Negative Final    Amphetamine Screen, Urine 06/05/2024 Positive (A)  Negative Final    Benzodiazepine Screen, Urine 06/05/2024 Positive (A)  Negative Final    Tricyclic Antidepressants Screen 06/05/2024 Negative  Negative Final    Methadone Screen, Urine 06/05/2024 Negative  Negative Final    Barbiturates Screen, Urine 06/05/2024 Negative  Negative Final    Oxycodone Screen, Urine 06/05/2024 Negative  Negative Final    Buprenorphine, Screen, Urine 06/05/2024 Negative  Negative Final    Magnesium 06/05/2024 1.8  1.6 - 2.6 mg/dL Final    WBC 06/05/2024 8.22  3.40 - 10.80 10*3/mm3 Final    RBC 06/05/2024 4.45  4.14 - 5.80 10*6/mm3 Final    Hemoglobin 06/05/2024 13.2  13.0 - 17.7 g/dL Final    Hematocrit 06/05/2024 41.1  37.5 - 51.0 % Final    MCV 06/05/2024 92.4  79.0 - 97.0 fL Final    MCH 06/05/2024 29.7  26.6 - 33.0 pg Final    MCHC 06/05/2024 32.1  31.5 - 35.7 g/dL Final    RDW 06/05/2024 12.8  12.3 - 15.4 % Final    RDW-SD 06/05/2024 43.6  37.0 - 54.0 fl Final    MPV 06/05/2024 8.7  6.0 - 12.0 fL Final    Platelets 06/05/2024 319  140 - 450 10*3/mm3 Final    Neutrophil % 06/05/2024 41.2 (L)  42.7 - 76.0 % Final    Lymphocyte % 06/05/2024 43.4  19.6 - 45.3 % Final    Monocyte % 06/05/2024 10.9  5.0 - 12.0 % Final    Eosinophil % 06/05/2024 3.8  0.3 - 6.2 % Final    Basophil % 06/05/2024 0.5  0.0 - 1.5 % Final    Immature Grans % 06/05/2024 0.2  0.0 - 0.5 % Final    Neutrophils, Absolute 06/05/2024 3.38  1.70 -  7.00 10*3/mm3 Final    Lymphocytes, Absolute 06/05/2024 3.57 (H)  0.70 - 3.10 10*3/mm3 Final    Monocytes, Absolute 06/05/2024 0.90  0.10 - 0.90 10*3/mm3 Final    Eosinophils, Absolute 06/05/2024 0.31  0.00 - 0.40 10*3/mm3 Final    Basophils, Absolute 06/05/2024 0.04  0.00 - 0.20 10*3/mm3 Final    Immature Grans, Absolute 06/05/2024 0.02  0.00 - 0.05 10*3/mm3 Final    nRBC 06/05/2024 0.0  0.0 - 0.2 /100 WBC Final    Fentanyl, Urine 06/05/2024 Positive (A)  Negative Final        Condition on Discharge:  guarded    Vital Signs  Temp:  [97.2 °F (36.2 °C)-98.4 °F (36.9 °C)] 97.6 °F (36.4 °C)  Heart Rate:  [] 119  Resp:  [16-18] 18  BP: (123-163)/() 151/103      Discharge Disposition:  Left Against Medical Advice    Discharge Medications:     Discharge Medications      Patient Not Prescribed Medications Upon Discharge         Discharge Diet: Regular     Activity at Discharge: As tolerated     Follow-up Appointments  No future appointments.      Time: I spent  > 30  minutes on this discharge activity which included: face-to-face encounter with the patient, reviewing the data in the system, coordination of the care with the nursing staff as well as consultants, documentation, and entering orders.        Clinician:   Benjamin Baez MD  06/07/24  13:04 EDT

## 2024-06-07 NOTE — PROGRESS NOTES
Navigator is helping with the following referrals:    [facility] - [phone]  -Sent 6/7  -Gayle asks updated MD notes be sent Monday morning. 6/7